# Patient Record
Sex: FEMALE | Race: WHITE | NOT HISPANIC OR LATINO | Employment: FULL TIME | URBAN - METROPOLITAN AREA
[De-identification: names, ages, dates, MRNs, and addresses within clinical notes are randomized per-mention and may not be internally consistent; named-entity substitution may affect disease eponyms.]

---

## 2023-03-09 LAB
CHLAMYDIA TRACH., AMPLIFIED: NEGATIVE
N. GONORRHEA, AMPLIFIED: NEGATIVE
TRICHOMONAS VAGINALIS: NEGATIVE

## 2023-06-29 ENCOUNTER — HOSPITAL ENCOUNTER (OUTPATIENT)
Dept: DATA CONVERSION | Facility: HOSPITAL | Age: 33
End: 2023-06-29
Attending: INTERNAL MEDICINE | Admitting: INTERNAL MEDICINE
Payer: COMMERCIAL

## 2023-06-29 DIAGNOSIS — K59.09 OTHER CONSTIPATION: ICD-10-CM

## 2023-06-29 DIAGNOSIS — K59.00 CONSTIPATION, UNSPECIFIED: ICD-10-CM

## 2023-06-29 DIAGNOSIS — K64.0 FIRST DEGREE HEMORRHOIDS: ICD-10-CM

## 2023-06-29 DIAGNOSIS — Z12.11 ENCOUNTER FOR SCREENING FOR MALIGNANT NEOPLASM OF COLON: ICD-10-CM

## 2023-06-29 DIAGNOSIS — K29.50 UNSPECIFIED CHRONIC GASTRITIS WITHOUT BLEEDING: ICD-10-CM

## 2023-06-29 DIAGNOSIS — R10.13 EPIGASTRIC PAIN: ICD-10-CM

## 2023-06-29 DIAGNOSIS — K31.89 OTHER DISEASES OF STOMACH AND DUODENUM: ICD-10-CM

## 2023-06-29 DIAGNOSIS — K22.89 OTHER SPECIFIED DISEASE OF ESOPHAGUS: ICD-10-CM

## 2023-06-29 DIAGNOSIS — R13.10 DYSPHAGIA, UNSPECIFIED: ICD-10-CM

## 2023-06-29 DIAGNOSIS — Z88.2 ALLERGY STATUS TO SULFONAMIDES: ICD-10-CM

## 2023-07-07 LAB
COMPLETE PATHOLOGY REPORT: NORMAL
CONVERTED CLINICAL DIAGNOSIS-HISTORY: NORMAL
CONVERTED FINAL DIAGNOSIS: NORMAL
CONVERTED FINAL REPORT PDF LINK TO COPY AND PASTE: NORMAL
CONVERTED GROSS DESCRIPTION: NORMAL

## 2023-11-16 ENCOUNTER — OFFICE VISIT (OUTPATIENT)
Dept: SURGERY | Facility: CLINIC | Age: 33
End: 2023-11-16
Payer: COMMERCIAL

## 2023-11-16 VITALS
TEMPERATURE: 98.1 F | DIASTOLIC BLOOD PRESSURE: 76 MMHG | BODY MASS INDEX: 29.71 KG/M2 | HEIGHT: 64 IN | SYSTOLIC BLOOD PRESSURE: 114 MMHG | HEART RATE: 74 BPM | WEIGHT: 174 LBS

## 2023-11-16 DIAGNOSIS — K59.09 CHRONIC CONSTIPATION: ICD-10-CM

## 2023-11-16 DIAGNOSIS — K62.89 ANAL PAIN: Primary | ICD-10-CM

## 2023-11-16 PROCEDURE — 99213 OFFICE O/P EST LOW 20 MIN: CPT | Performed by: NURSE PRACTITIONER

## 2023-11-16 RX ORDER — DEXTROAMPHETAMINE SACCHARATE, AMPHETAMINE ASPARTATE MONOHYDRATE, DEXTROAMPHETAMINE SULFATE AND AMPHETAMINE SULFATE 5; 5; 5; 5 MG/1; MG/1; MG/1; MG/1
20 CAPSULE, EXTENDED RELEASE ORAL
COMMUNITY
Start: 2022-10-16

## 2023-11-16 RX ORDER — SERTRALINE HYDROCHLORIDE 200 MG/1
CAPSULE ORAL
COMMUNITY

## 2023-11-16 ASSESSMENT — ENCOUNTER SYMPTOMS: RECTAL PAIN: 1

## 2023-11-16 NOTE — PROGRESS NOTES
Subjective   Patient ID: Flora Burroughs is a 32 y.o. female who presents today with anal pain.    HPI  She has hemorrhoidal issues and will have anal pain almost daily.  When she had her son 6 years ago, that is when it anal pain started.  The hemorrhoids will prolapse out at times and then go back in.  She will have rectal bleeding as well. She will have a little leakage of stool and mucus a few times ever.  She will have a soft-hard BM every 3-4 days.  She is not taking any fiber supplements or laxatives.  She will sit long on the toilet to have a BM.    Colonosocpy/EGD this summer and it was negative.        Review of Systems   Gastrointestinal:  Positive for rectal pain.   All other systems reviewed and are negative.      Objective   Physical Exam  Constitutional:       Appearance: Normal appearance.   HENT:      Head: Normocephalic.   Cardiovascular:      Rate and Rhythm: Normal rate and regular rhythm.   Pulmonary:      Effort: Pulmonary effort is normal.      Breath sounds: Normal breath sounds.   Abdominal:      General: Abdomen is flat. Bowel sounds are normal.      Palpations: Abdomen is soft.   Genitourinary:     Comments: She has a very small anal skin tag in the anterior midline with no irritation.  On MATTHEW, she had a lot of discomfort in all quadrants.  Did not do an anoscopy d/t the pain.  Musculoskeletal:         General: Normal range of motion.      Cervical back: Normal range of motion and neck supple.   Skin:     General: Skin is warm and dry.   Neurological:      General: No focal deficit present.      Mental Status: She is alert and oriented to person, place, and time.   Psychiatric:         Mood and Affect: Mood normal.         Behavior: Behavior normal.         Assessment/Plan   Flora has an anterior midline anal skin tag and anal pain on MATTHEW.  She will start using Hydrocortisone suppositories BID for 2 weeks.  She will start taking Miralax along with the Metamucil daily.  She will continue to  increase her fiber and water intake.  We talked about rubber band ligation in the near future if not better.  She will call with any issues and will follow up in 4 weeks.

## 2024-02-06 NOTE — H&P (VIEW-ONLY)
HISTORY OF PRESENTING ILLNESS: Flora Burroughs is a 33yoF with an anal skin tag.  She was referred by India Pichardo CNP to discuss surgical removal.  Patient has been seen in the office in the past for episodes of hemorrhoid flares associated with pain and bleeding.  During her last visit attempts were made at banding however due to the degree of discomfort this was aborted.  Instead of performing banding it was recommended that she initiate bowel regimen that included MiraLAX along with daily Metamucil.  She has admitted that this has helped however she does have occasional episodes of discomfort perianally.  On further discussion she admits that she is not as consistent with her regimen and in fact when she does take this she feels some improvement in her overall condition.  She states that she does have perianal skin tags that causes some symptoms of pain and bleeding occasionally.    Normal colonoscopy in 2023 for history of constipation.      Past Medical History:   Diagnosis Date    Abnormal cytological findings in specimens from other organs, systems and tissues     LSIL (low grade squamous intraepithelial lesion) on Pap smear    Anxiety disorder, unspecified     Anxiety and depression    Personal history of other diseases of the digestive system     History of gastroesophageal reflux (GERD)    Personal history of other diseases of the respiratory system     History of asthma    Personal history of other diseases of the respiratory system     History of bronchitis    Personal history of other diseases of urinary system     History of kidney disease    Personal history of other endocrine, nutritional and metabolic disease     History of high cholesterol    Personal history of other specified conditions     History of heartburn         Past Surgical History:   Procedure Laterality Date    COLONOSCOPY      OTHER SURGICAL HISTORY  02/02/2021    No history of surgery         Social History     Tobacco Use     Smoking status: Never   Substance Use Topics    Alcohol use: Never         No family history on file.        Current Outpatient Medications:     amphetamine-dextroamphetamine XR (Adderall XR) 20 mg 24 hr capsule, Take 1 capsule (20 mg) by mouth., Disp: , Rfl:     sertraline 200 mg capsule, Take by mouth., Disp: , Rfl:        Allergies   Allergen Reactions    Sulfa (Sulfonamide Antibiotics) Rash         REVIEW OF SYSTEMS:  Review of Systems   Constitutional: Negative.    Respiratory: Negative.     Cardiovascular: Negative.    Gastrointestinal:  Positive for anal bleeding and constipation.   Genitourinary: Negative.    Skin: Negative.    Neurological: Negative.    Psychiatric/Behavioral: Negative.         Labs:       Imaging:  No results found.     Physical Exam:  Physical Exam  Constitutional:       Appearance: Normal appearance.   HENT:      Head: Normocephalic and atraumatic.   Eyes:      Extraocular Movements: Extraocular movements intact.   Cardiovascular:      Rate and Rhythm: Normal rate.   Pulmonary:      Effort: Pulmonary effort is normal.   Genitourinary:     Comments: deffered  Musculoskeletal:      Cervical back: Normal range of motion.   Neurological:      General: No focal deficit present.      Mental Status: She is alert.   Psychiatric:         Mood and Affect: Mood normal.       ASSESSMENT AND PLAN: I had a long discussion with the patient regarding risks and benefits of hemorrhoid/skin tag surgery.  Patient desires to have her skin tag at least removed.  She is aware of risks including not limited to severe perianal infection incontinence bleeding etc.  We discussed the occasional need for concurrent hemorrhoidectomy depending on the anatomy of the skin tag.  Because of this possibility she is also aware of the severe discomfort and alteration to clear life in the immediate postop period with regards to missing work etc.  The patient would like to proceed with surgical intervention.    Brandon FULLER  MD Tessie Ramos, RN   2/6/2024

## 2024-02-13 ENCOUNTER — OFFICE VISIT (OUTPATIENT)
Dept: SURGERY | Facility: CLINIC | Age: 34
End: 2024-02-13
Payer: COMMERCIAL

## 2024-02-13 ENCOUNTER — PREP FOR PROCEDURE (OUTPATIENT)
Dept: SURGERY | Facility: CLINIC | Age: 34
End: 2024-02-13

## 2024-02-13 VITALS
SYSTOLIC BLOOD PRESSURE: 117 MMHG | TEMPERATURE: 98.3 F | HEIGHT: 64 IN | DIASTOLIC BLOOD PRESSURE: 89 MMHG | HEART RATE: 73 BPM | BODY MASS INDEX: 30.56 KG/M2 | WEIGHT: 179 LBS

## 2024-02-13 DIAGNOSIS — K62.89 ANAL PAIN: Primary | ICD-10-CM

## 2024-02-13 DIAGNOSIS — K64.4 ANAL SKIN TAG: Primary | ICD-10-CM

## 2024-02-13 PROCEDURE — 99214 OFFICE O/P EST MOD 30 MIN: CPT | Performed by: COLON & RECTAL SURGERY

## 2024-02-13 PROCEDURE — 1036F TOBACCO NON-USER: CPT | Performed by: COLON & RECTAL SURGERY

## 2024-02-13 RX ORDER — SODIUM CHLORIDE, SODIUM LACTATE, POTASSIUM CHLORIDE, CALCIUM CHLORIDE 600; 310; 30; 20 MG/100ML; MG/100ML; MG/100ML; MG/100ML
10 INJECTION, SOLUTION INTRAVENOUS CONTINUOUS
Status: CANCELLED | OUTPATIENT
Start: 2024-02-13

## 2024-02-13 ASSESSMENT — ENCOUNTER SYMPTOMS
PSYCHIATRIC NEGATIVE: 1
NEUROLOGICAL NEGATIVE: 1
CONSTIPATION: 1
ANAL BLEEDING: 1
CONSTITUTIONAL NEGATIVE: 1
RESPIRATORY NEGATIVE: 1
CARDIOVASCULAR NEGATIVE: 1

## 2024-02-13 ASSESSMENT — PAIN SCALES - GENERAL: PAINLEVEL: 3

## 2024-02-21 RX ORDER — VALACYCLOVIR HYDROCHLORIDE 500 MG/1
500 TABLET, FILM COATED ORAL DAILY
COMMUNITY

## 2024-03-06 ENCOUNTER — ANESTHESIA EVENT (OUTPATIENT)
Dept: OPERATING ROOM | Facility: CLINIC | Age: 34
End: 2024-03-06
Payer: COMMERCIAL

## 2024-03-06 ENCOUNTER — ANESTHESIA (OUTPATIENT)
Dept: OPERATING ROOM | Facility: CLINIC | Age: 34
End: 2024-03-06
Payer: COMMERCIAL

## 2024-03-06 ENCOUNTER — HOSPITAL ENCOUNTER (OUTPATIENT)
Facility: CLINIC | Age: 34
Setting detail: OUTPATIENT SURGERY
Discharge: HOME | End: 2024-03-06
Attending: COLON & RECTAL SURGERY | Admitting: COLON & RECTAL SURGERY
Payer: COMMERCIAL

## 2024-03-06 VITALS
TEMPERATURE: 98.1 F | RESPIRATION RATE: 16 BRPM | HEIGHT: 65 IN | OXYGEN SATURATION: 99 % | BODY MASS INDEX: 29.9 KG/M2 | SYSTOLIC BLOOD PRESSURE: 110 MMHG | WEIGHT: 179.45 LBS | HEART RATE: 69 BPM | DIASTOLIC BLOOD PRESSURE: 66 MMHG

## 2024-03-06 DIAGNOSIS — K64.4 ANAL SKIN TAG: Primary | ICD-10-CM

## 2024-03-06 LAB — PREGNANCY TEST URINE, POC: NEGATIVE

## 2024-03-06 PROCEDURE — 2500000004 HC RX 250 GENERAL PHARMACY W/ HCPCS (ALT 636 FOR OP/ED): Mod: SE

## 2024-03-06 PROCEDURE — 3700000002 HC GENERAL ANESTHESIA TIME - EACH INCREMENTAL 1 MINUTE: Performed by: COLON & RECTAL SURGERY

## 2024-03-06 PROCEDURE — 7100000009 HC PHASE TWO TIME - INITIAL BASE CHARGE: Performed by: COLON & RECTAL SURGERY

## 2024-03-06 PROCEDURE — 0752T DGTZ GLS MCRSCP SLD LVL III: CPT | Mod: TC,ELYLAB | Performed by: COLON & RECTAL SURGERY

## 2024-03-06 PROCEDURE — A46220 PR EXCISION SINGLE EXTERNAL PAPILLA OR TAG ANUS

## 2024-03-06 PROCEDURE — 3700000001 HC GENERAL ANESTHESIA TIME - INITIAL BASE CHARGE: Performed by: COLON & RECTAL SURGERY

## 2024-03-06 PROCEDURE — 3600000005 HC OR TIME - INITIAL BASE CHARGE - PROCEDURE LEVEL FIVE: Performed by: COLON & RECTAL SURGERY

## 2024-03-06 PROCEDURE — 2500000004 HC RX 250 GENERAL PHARMACY W/ HCPCS (ALT 636 FOR OP/ED): Mod: SE | Performed by: COLON & RECTAL SURGERY

## 2024-03-06 PROCEDURE — 7100000010 HC PHASE TWO TIME - EACH INCREMENTAL 1 MINUTE: Performed by: COLON & RECTAL SURGERY

## 2024-03-06 PROCEDURE — 88304 TISSUE EXAM BY PATHOLOGIST: CPT | Performed by: PATHOLOGY

## 2024-03-06 PROCEDURE — A46220 PR EXCISION SINGLE EXTERNAL PAPILLA OR TAG ANUS: Performed by: ANESTHESIOLOGY

## 2024-03-06 PROCEDURE — 46922 EXCISION OF ANAL LESION(S): CPT | Performed by: COLON & RECTAL SURGERY

## 2024-03-06 PROCEDURE — 2500000005 HC RX 250 GENERAL PHARMACY W/O HCPCS: Mod: SE | Performed by: COLON & RECTAL SURGERY

## 2024-03-06 PROCEDURE — 3600000010 HC OR TIME - EACH INCREMENTAL 1 MINUTE - PROCEDURE LEVEL FIVE: Performed by: COLON & RECTAL SURGERY

## 2024-03-06 RX ORDER — ONDANSETRON HYDROCHLORIDE 2 MG/ML
4 INJECTION, SOLUTION INTRAVENOUS ONCE AS NEEDED
Status: DISCONTINUED | OUTPATIENT
Start: 2024-03-06 | End: 2024-03-06 | Stop reason: HOSPADM

## 2024-03-06 RX ORDER — OXYCODONE HYDROCHLORIDE 5 MG/1
5 TABLET ORAL EVERY 4 HOURS PRN
Status: DISCONTINUED | OUTPATIENT
Start: 2024-03-06 | End: 2024-03-06 | Stop reason: HOSPADM

## 2024-03-06 RX ORDER — SODIUM CHLORIDE, SODIUM LACTATE, POTASSIUM CHLORIDE, CALCIUM CHLORIDE 600; 310; 30; 20 MG/100ML; MG/100ML; MG/100ML; MG/100ML
10 INJECTION, SOLUTION INTRAVENOUS CONTINUOUS
Status: DISCONTINUED | OUTPATIENT
Start: 2024-03-06 | End: 2024-03-06 | Stop reason: HOSPADM

## 2024-03-06 RX ORDER — MIDAZOLAM HYDROCHLORIDE 1 MG/ML
INJECTION, SOLUTION INTRAMUSCULAR; INTRAVENOUS AS NEEDED
Status: DISCONTINUED | OUTPATIENT
Start: 2024-03-06 | End: 2024-03-06

## 2024-03-06 RX ORDER — PROPOFOL 10 MG/ML
INJECTION, EMULSION INTRAVENOUS CONTINUOUS PRN
Status: DISCONTINUED | OUTPATIENT
Start: 2024-03-06 | End: 2024-03-06

## 2024-03-06 RX ORDER — PROPOFOL 10 MG/ML
INJECTION, EMULSION INTRAVENOUS AS NEEDED
Status: DISCONTINUED | OUTPATIENT
Start: 2024-03-06 | End: 2024-03-06

## 2024-03-06 RX ORDER — BUPIVACAINE HYDROCHLORIDE 5 MG/ML
INJECTION, SOLUTION PERINEURAL AS NEEDED
Status: DISCONTINUED | OUTPATIENT
Start: 2024-03-06 | End: 2024-03-06 | Stop reason: HOSPADM

## 2024-03-06 RX ORDER — FENTANYL CITRATE 50 UG/ML
INJECTION, SOLUTION INTRAMUSCULAR; INTRAVENOUS AS NEEDED
Status: DISCONTINUED | OUTPATIENT
Start: 2024-03-06 | End: 2024-03-06

## 2024-03-06 RX ORDER — SODIUM CHLORIDE, SODIUM LACTATE, POTASSIUM CHLORIDE, CALCIUM CHLORIDE 600; 310; 30; 20 MG/100ML; MG/100ML; MG/100ML; MG/100ML
100 INJECTION, SOLUTION INTRAVENOUS CONTINUOUS
Status: DISCONTINUED | OUTPATIENT
Start: 2024-03-06 | End: 2024-03-06 | Stop reason: HOSPADM

## 2024-03-06 RX ORDER — ALBUTEROL SULFATE 0.83 MG/ML
2.5 SOLUTION RESPIRATORY (INHALATION) ONCE AS NEEDED
Status: DISCONTINUED | OUTPATIENT
Start: 2024-03-06 | End: 2024-03-06 | Stop reason: HOSPADM

## 2024-03-06 RX ORDER — LIDOCAINE IN NACL,ISO-OSMOT/PF 30 MG/3 ML
0.1 SYRINGE (ML) INJECTION ONCE
Status: DISCONTINUED | OUTPATIENT
Start: 2024-03-06 | End: 2024-03-06 | Stop reason: HOSPADM

## 2024-03-06 RX ADMIN — MIDAZOLAM 2 MG: 1 INJECTION INTRAMUSCULAR; INTRAVENOUS at 08:42

## 2024-03-06 RX ADMIN — PROPOFOL 400 MCG/KG/MIN: 10 INJECTION, EMULSION INTRAVENOUS at 08:49

## 2024-03-06 RX ADMIN — SODIUM CHLORIDE, POTASSIUM CHLORIDE, SODIUM LACTATE AND CALCIUM CHLORIDE: 600; 310; 30; 20 INJECTION, SOLUTION INTRAVENOUS at 08:42

## 2024-03-06 RX ADMIN — FENTANYL CITRATE 50 MCG: 50 INJECTION, SOLUTION INTRAMUSCULAR; INTRAVENOUS at 08:48

## 2024-03-06 RX ADMIN — FENTANYL CITRATE 50 MCG: 50 INJECTION, SOLUTION INTRAMUSCULAR; INTRAVENOUS at 08:56

## 2024-03-06 SDOH — HEALTH STABILITY: MENTAL HEALTH: CURRENT SMOKER: 0

## 2024-03-06 ASSESSMENT — PAIN - FUNCTIONAL ASSESSMENT
PAIN_FUNCTIONAL_ASSESSMENT: 0-10

## 2024-03-06 ASSESSMENT — PAIN SCALES - GENERAL
PAINLEVEL_OUTOF10: 0 - NO PAIN
PAINLEVEL_OUTOF10: 0 - NO PAIN
PAIN_LEVEL: 0
PAINLEVEL_OUTOF10: 0 - NO PAIN
PAINLEVEL_OUTOF10: 0 - NO PAIN

## 2024-03-06 ASSESSMENT — COLUMBIA-SUICIDE SEVERITY RATING SCALE - C-SSRS
2. HAVE YOU ACTUALLY HAD ANY THOUGHTS OF KILLING YOURSELF?: NO
6. HAVE YOU EVER DONE ANYTHING, STARTED TO DO ANYTHING, OR PREPARED TO DO ANYTHING TO END YOUR LIFE?: NO
1. IN THE PAST MONTH, HAVE YOU WISHED YOU WERE DEAD OR WISHED YOU COULD GO TO SLEEP AND NOT WAKE UP?: NO

## 2024-03-06 NOTE — OP NOTE
Excision Lesion Anus Operative Note     Date: 3/6/2024  OR Location: [facility] OR    Name: Flora Burroughs, : 1990, Age: 33 y.o., MRN: [id number], Sex: female    Diagnosis  Pre-op Diagnosis     * Anal skin tag [K64.4] Post-op Diagnosis     * Anal skin tag [K64.4]     Procedures  Excision Lesion Anus  49998 - AL DSTRJ LESION ANUS SIMPLE SURG EXCISION      Surgeons      * Brandon Ramos - Primary    Resident/Fellow/Other Assistant:  Surgeon(s) and Role:    Procedure Summary  Anesthesia: Monitor Anesthesia Care  ASA: ASA status not filed in the log.  Anesthesia Staff: Anesthesiologist: MD ZENA Woodson-AA: LYNN Gunter  Estimated Blood Loss: 10mL  Intra-op Medications:   Administrations occurring from 0815 to 0900 on 24:   Medication Name Total Dose   lactated Ringer's infusion Cannot be calculated              Anesthesia Record               Intraprocedure I/O Totals          Intake    Propofol Drip 0.00 mL    The total shown is the total volume documented since Anesthesia Start was filed.    Total Intake 0 mL          Specimen:   ID Type Source Tests Collected by Time   1 : Anal skin tag Tissue ANUS TAG SURGICAL PATHOLOGY EXAM Brandon Ramos MD 3/6/2024 0903        Staff:   Circulator: Rosalinda Kahn RN      Findings: 0.5 cm anterior midline skin tag    Indications: Flora Burroughs is an 33 y.o. female who is having surgery for Anal skin tag [K64.4].     The patient was seen in the preoperative area. The risks, benefits, complications, treatment options, non-operative alternatives, expected recovery and outcomes were discussed with the patient. The possibilities of reaction to medication, pulmonary aspiration, injury to surrounding structures, bleeding, recurrent infection, the need for additional procedures, failure to diagnose a condition, and creating a complication requiring transfusion or operation were discussed with the patient. The patient concurred with the proposed plan,  giving informed consent.  The site of surgery was properly noted/marked if necessary per policy. The patient has been actively warmed in preoperative area. Preoperative antibiotics are not indicated. Venous thrombosis prophylaxis are not indicated.    Procedure Details: Patient was placed supine on the operating table.  Anesthesia was induced.  Her legs were placed in candycane stirrups.  Examination of the perianal area revealed a 0.5 cm anterior midline anal skin tag.  No other abnormalities were noted.  A digital exam was performed after the patient patient was prepped and draped in a sterile fashion.  Exam revealed a normal anal canal.  Hill-Rivera retractor was then placed transanally and all 4 quadrants were examined.  Grade 1 hemorrhoids were noted on exam no evidence of active bleeding.  3 cc of quarter percent Marcaine was then injected into the base of the skin tag and using Bovie electrocautery the skin tag was excised in its entirety taking care not to injure the underlying sphincter mechanism.  Using 0 Vicryl stitches in a vertical mattress fashion the wound was closed.  Patient tolerated procedure well without evidence any complication she was awake and transferred to the PACU for postop care.  Complications:  None; patient tolerated the procedure well.    Disposition: PACU - hemodynamically stable.  Condition: stable       Attending Attestation: I was present and scrubbed for the entire procedure.    Brandon Ramos  Phone Number: [phone]

## 2024-03-06 NOTE — DISCHARGE INSTRUCTIONS
"Brandon Ramos MD  [facility]  Office Phone: [phone]  After Hours: [phone]     POST-OPERATIVE INSTRUCTIONS FOR  AMBULATORY ANORECTAL SURGERY        Wound Care  -Take all the bandages off in the late afternoon or evening and take the first hot bath  -The bath water should be as warm as tolerable, without causing burns  -It is NOT necessary to add anything to the water (such as Epsom salt)  -Hot baths should be used at least 3-4 times each day AND especially after each bowel movement  -The \"packing\" (if present) should be removed from the anus during the first bath  -Expect some oozing or slight bleeding  -No bandages are necessary, but may be used as desired to absorb any drainage: plain gauze or maxi pads    Pain  -The anesthetic that was injected at the time of surgery should last 3-6 hours  -It is normal for the anal area to be very painful for several days, and especially after the initial bowel movements  -HOT BATHS PROVIDE THE BEST PAIN RELIEF, and should be used liberally  -The prescription you have been given is for a strong narcotic pain medication. Take 1-2 tabs every 4-6 hours as needed for pain  -Use the pain medication as necessary, but be aware that it will cause some degree of constipation  -If the pain is less severe, Advil (up to 600 mg every 4 hours) or Tylenol (up to 1000 mg every 4 hours) should be substituted. Do not take Percocet AND Tylenol together at the same time. You may take Percocet and Advil.     Skin Irritation:  -If you have increased drainage after your procedure, this can cause skin irritation which can be uncomfortable.   -To minimize skin irritation keep skin clean and dry by patting area dry or use a hair dryer on cool to eliminate moisture. Use a skin barrier cream around anus such as Aquaphor.   -For excess moisture, place a cotton ball or 4x4 gauze pad on the outside of anus and change as needed. This will wick away moisture from " skin.    Diet  -Eat as much fiber as possible: fruits, vegetables, salads, whole grain breads, bran cereals, bran muffins, prunes and prune juice  -Drink lots of fluids: water and juices  -Avoid spicy foods until the wounds are healed          Bowel Movements  -It is common not to move your bowels for 2 or 3 days after surgery  -It is important to try to avoid becoming constipated  -A high fiber diet is essential  -Take fiber powder such as Metamucil once or twice a day, and/or Colace three times a day  -Excessive pressure in the rectum (or even pain) may indicate the need to have a bowel movement  -If a laxative is required usually try two tablespoons of Milk of Magnesia; if this is not effective, take Miralax  -THE FIRST BOWEL MOVEMENT HURTS!!!    Activity  -You may resume normal activities, including exercising, as soon as you feel up to it  -You can return to work whenever you feel able  -There is no way you can do yourself any harm or affect the success of the surgery by excessive activity    Things to Watch For  -A small amount of bleeding or oozing is normal, especially with bowel movements; if bleeding is heavy, or does not stop after bowel movements, call me immediately.  -It is not unusual to have difficulty urinating after surgery; often it is necessary it urinate while sitting in the hot bath; frequent urination of very small volumes is abnormal and requires that you call me. If you have not urinated at all by the first evening, you must call.    Medications:  Resume your medication(s) as prescribed, unless otherwise directed.      Follow-up Information  -If you do not already have an appointment, call my office within a few days, and make an appointment for a check-up about 6 weeks after your surgery, call: [phone] Option 1.   -If there are questions or problems either I or one of my partners can be reached 24 hours a day, seven days a week. Call the office [phone] Option 2 and the service  will take a message and arrange a call back.    Tylenol given at 8:00 am Next tylenol at 2pm    Scopalamine patch may stay on for 72 hrs.  Remove patch, discard away form pets, children.  Do not touch eyes!  Wash hands thoroughly

## 2024-03-06 NOTE — ANESTHESIA POSTPROCEDURE EVALUATION
Patient: Flora Burroughs    Procedure Summary       Date: 03/06/24 Room / Location: [facility] OR 03 / [facility] [place] [facility]    Anesthesia Start: 0842 Anesthesia Stop: 0912    Procedure: Excision Lesion Anus Diagnosis:       Anal skin tag      (Anal skin tag [K64.4])    Surgeons: Brandon Ramos MD Responsible Provider: Oz Don MD    Anesthesia Type: MAC ASA Status: 2            Anesthesia Type: MAC    Vitals Value Taken Time   /45 03/06/24 0913   Temp 36.5 03/06/24 0913   Pulse 63 03/06/24 0913   Resp 16 03/06/24 0913   SpO2 100 03/06/24 0913       Anesthesia Post Evaluation    Patient location during evaluation: bedside  Patient participation: waiting for patient participation  Level of consciousness: awake  Pain score: 0  Pain management: adequate  Airway patency: patent  Cardiovascular status: acceptable  Respiratory status: acceptable  Hydration status: acceptable  Postoperative Nausea and Vomiting: none      There were no known notable events for this encounter.

## 2024-03-06 NOTE — ANESTHESIA PREPROCEDURE EVALUATION
Patient: Flora Burroughs    Procedure Information       Anesthesia Start Date/Time: 03/06/24 0842    Procedure: Excision Lesion Anus    Location: [facility] OR 03 / [facility]    Surgeons: Brandon Ramos MD            Relevant Problems   No relevant active problems       Clinical information reviewed:   Tobacco  Allergies  Meds   Med Hx  Surg Hx  OB Status  Fam Hx  Soc   Hx        NPO Detail:  NPO/Void Status  Date of Last Liquid: 03/05/24  Time of Last Liquid: 2000  Date of Last Solid: 03/05/24  Time of Last Solid: 2000         PHYSICAL EXAM    Anesthesia Plan    History of general anesthesia?: yes  History of complications of general anesthesia?: no    ASA 2     general     The patient is not a current smoker.  Patient was previously instructed to abstain from smoking on day of procedure.  Patient did not smoke on day of procedure.    intravenous induction   Anesthetic plan and risks discussed with patient.  Use of blood products discussed with patient who.    Plan discussed with CAA and attending.

## 2024-03-07 ASSESSMENT — PAIN SCALES - GENERAL: PAINLEVEL_OUTOF10: 4

## 2024-03-13 LAB
LABORATORY COMMENT REPORT: NORMAL
PATH REPORT.FINAL DX SPEC: NORMAL
PATH REPORT.GROSS SPEC: NORMAL
PATH REPORT.RELEVANT HX SPEC: NORMAL
PATH REPORT.TOTAL CANCER: NORMAL

## 2024-04-18 ENCOUNTER — APPOINTMENT (OUTPATIENT)
Dept: SURGERY | Facility: CLINIC | Age: 34
End: 2024-04-18
Payer: COMMERCIAL

## 2024-04-25 ENCOUNTER — APPOINTMENT (OUTPATIENT)
Dept: SURGERY | Facility: CLINIC | Age: 34
End: 2024-04-25
Payer: COMMERCIAL

## 2024-07-08 ENCOUNTER — APPOINTMENT (OUTPATIENT)
Dept: PRIMARY CARE | Facility: CLINIC | Age: 34
End: 2024-07-08
Payer: COMMERCIAL

## 2024-07-10 ENCOUNTER — OFFICE VISIT (OUTPATIENT)
Dept: PRIMARY CARE | Facility: CLINIC | Age: 34
End: 2024-07-10
Payer: COMMERCIAL

## 2024-07-10 VITALS
HEART RATE: 79 BPM | HEIGHT: 64 IN | BODY MASS INDEX: 28.89 KG/M2 | WEIGHT: 169.2 LBS | SYSTOLIC BLOOD PRESSURE: 103 MMHG | OXYGEN SATURATION: 97 % | DIASTOLIC BLOOD PRESSURE: 67 MMHG | TEMPERATURE: 97.6 F | RESPIRATION RATE: 16 BRPM

## 2024-07-10 DIAGNOSIS — Z86.19 HX OF COLD SORES: ICD-10-CM

## 2024-07-10 DIAGNOSIS — F90.9 ATTENTION DEFICIT HYPERACTIVITY DISORDER (ADHD), UNSPECIFIED ADHD TYPE: ICD-10-CM

## 2024-07-10 DIAGNOSIS — F41.9 ANXIETY: ICD-10-CM

## 2024-07-10 DIAGNOSIS — F32.A DEPRESSION, UNSPECIFIED DEPRESSION TYPE: Primary | ICD-10-CM

## 2024-07-10 PROCEDURE — 99213 OFFICE O/P EST LOW 20 MIN: CPT | Performed by: NURSE PRACTITIONER

## 2024-07-10 PROCEDURE — 1036F TOBACCO NON-USER: CPT | Performed by: NURSE PRACTITIONER

## 2024-07-10 RX ORDER — VALACYCLOVIR HYDROCHLORIDE 500 MG/1
500 TABLET, FILM COATED ORAL DAILY
Qty: 30 TABLET | Refills: 5 | Status: SHIPPED | OUTPATIENT
Start: 2024-07-10 | End: 2025-07-10

## 2024-07-10 RX ORDER — SERTRALINE HYDROCHLORIDE 100 MG/1
100 TABLET, FILM COATED ORAL 2 TIMES DAILY
Start: 2024-07-10 | End: 2024-09-08

## 2024-07-10 RX ORDER — DEXTROAMPHETAMINE SACCHARATE, AMPHETAMINE ASPARTATE MONOHYDRATE, DEXTROAMPHETAMINE SULFATE AND AMPHETAMINE SULFATE 7.5; 7.5; 7.5; 7.5 MG/1; MG/1; MG/1; MG/1
30 CAPSULE, EXTENDED RELEASE ORAL
COMMUNITY
Start: 2024-02-19

## 2024-07-10 ASSESSMENT — ENCOUNTER SYMPTOMS
SLEEP DISTURBANCE: 0
FEVER: 0
HYPERACTIVE: 0
DIARRHEA: 0
ACTIVITY CHANGE: 0
APNEA: 0
DIAPHORESIS: 0
APPETITE CHANGE: 0
CHILLS: 0
CHEST TIGHTNESS: 0
PALPITATIONS: 0
CHOKING: 0
COUGH: 0
ABDOMINAL PAIN: 0
VOMITING: 0
HALLUCINATIONS: 0
SHORTNESS OF BREATH: 0
UNEXPECTED WEIGHT CHANGE: 0
STRIDOR: 0
AGITATION: 0
FATIGUE: 0
DECREASED CONCENTRATION: 0
CONFUSION: 0
WHEEZING: 0
NAUSEA: 0

## 2024-07-10 NOTE — PROGRESS NOTES
"Subjective   Patient ID: Flora Burroughs is a 33 y.o. female who presents for ADHD, Depression, and Anxiety.     HPI  Patient in office for a follow-up on anxiety, depression (both well-controlled on Zoloft), and ADHD (takes Adderall as needed; ran out weeks ago). The patient last saw her psychiatrist 5 months ago. The patient has new health insurance and agrees to a referral to a new psychiatrist, to be reevaluated for ADHD treatment. The patient has supplies of Zoloft. She also needs a refill on Valacyclovir, which she takes daily for cold sore suppression therapy (last episode 1 year ago). She has no other concerns today.    JESSI-7: 3/21; PHQ-9: 8/27    Review of Systems   Constitutional:  Negative for activity change, appetite change, chills, diaphoresis, fatigue, fever and unexpected weight change.   Respiratory:  Negative for apnea, cough, choking, chest tightness, shortness of breath, wheezing and stridor.    Cardiovascular:  Negative for chest pain, palpitations and leg swelling.   Gastrointestinal:  Negative for abdominal pain, diarrhea, nausea and vomiting.   Skin:         Hx of cold sores   Psychiatric/Behavioral:  Negative for agitation, behavioral problems, confusion, decreased concentration, hallucinations, self-injury, sleep disturbance and suicidal ideas. The patient is not hyperactive.         Hx of anxiety, depression, and ADHD       Objective   /67   Pulse 79   Temp 36.4 °C (97.6 °F) (Temporal)   Resp 16   Ht 1.626 m (5' 4\")   Wt 76.7 kg (169 lb 3.2 oz)   SpO2 97%   BMI 29.04 kg/m²     Physical Exam  Constitutional:       Appearance: Normal appearance.   Eyes:      Conjunctiva/sclera: Conjunctivae normal.   Cardiovascular:      Rate and Rhythm: Normal rate.   Pulmonary:      Effort: Pulmonary effort is normal.   Skin:     Findings: No lesion or rash.   Neurological:      General: No focal deficit present.      Mental Status: She is alert.      Gait: Gait normal.   Psychiatric:         " Mood and Affect: Mood normal.       Assessment/Plan     Referral, medication and plan of care reviewed with patient. Follow up in 3 months for a physical or earlier as needed.

## 2024-12-10 ENCOUNTER — LAB (OUTPATIENT)
Dept: LAB | Facility: LAB | Age: 34
End: 2024-12-10
Payer: COMMERCIAL

## 2024-12-10 DIAGNOSIS — E03.9 HYPOTHYROIDISM, UNSPECIFIED: ICD-10-CM

## 2024-12-10 DIAGNOSIS — E55.9 VITAMIN D DEFICIENCY, UNSPECIFIED: Primary | ICD-10-CM

## 2024-12-10 DIAGNOSIS — E11.9 TYPE 2 DIABETES MELLITUS WITHOUT COMPLICATIONS (MULTI): ICD-10-CM

## 2024-12-10 LAB
25(OH)D3 SERPL-MCNC: 24 NG/ML (ref 30–100)
ALBUMIN SERPL BCP-MCNC: 4.4 G/DL (ref 3.4–5)
ALP SERPL-CCNC: 58 U/L (ref 33–110)
ALT SERPL W P-5'-P-CCNC: 11 U/L (ref 7–45)
ANION GAP SERPL CALC-SCNC: 13 MMOL/L (ref 10–20)
AST SERPL W P-5'-P-CCNC: 16 U/L (ref 9–39)
BILIRUB SERPL-MCNC: 0.4 MG/DL (ref 0–1.2)
BUN SERPL-MCNC: 14 MG/DL (ref 6–23)
CALCIUM SERPL-MCNC: 9.5 MG/DL (ref 8.6–10.6)
CHLORIDE SERPL-SCNC: 104 MMOL/L (ref 98–107)
CO2 SERPL-SCNC: 26 MMOL/L (ref 21–32)
CREAT SERPL-MCNC: 0.86 MG/DL (ref 0.5–1.05)
EGFRCR SERPLBLD CKD-EPI 2021: >90 ML/MIN/1.73M*2
GLUCOSE SERPL-MCNC: 92 MG/DL (ref 74–99)
POTASSIUM SERPL-SCNC: 4.2 MMOL/L (ref 3.5–5.3)
PROT SERPL-MCNC: 7.4 G/DL (ref 6.4–8.2)
SODIUM SERPL-SCNC: 139 MMOL/L (ref 136–145)
T4 FREE SERPL-MCNC: 1 NG/DL (ref 0.78–1.48)
THYROPEROXIDASE AB SERPL-ACNC: >1000 IU/ML
TSH SERPL-ACNC: 4.42 MIU/L (ref 0.44–3.98)

## 2024-12-10 PROCEDURE — 36415 COLL VENOUS BLD VENIPUNCTURE: CPT

## 2024-12-10 PROCEDURE — 84439 ASSAY OF FREE THYROXINE: CPT

## 2024-12-10 PROCEDURE — 82306 VITAMIN D 25 HYDROXY: CPT

## 2024-12-10 PROCEDURE — 80053 COMPREHEN METABOLIC PANEL: CPT

## 2024-12-10 PROCEDURE — 86376 MICROSOMAL ANTIBODY EACH: CPT

## 2024-12-10 PROCEDURE — 84443 ASSAY THYROID STIM HORMONE: CPT

## 2025-04-15 PROCEDURE — RXMED WILLOW AMBULATORY MEDICATION CHARGE

## 2025-04-17 ENCOUNTER — PHARMACY VISIT (OUTPATIENT)
Dept: PHARMACY | Facility: CLINIC | Age: 35
End: 2025-04-17
Payer: COMMERCIAL

## 2025-05-16 ENCOUNTER — OFFICE VISIT (OUTPATIENT)
Dept: URGENT CARE | Age: 35
End: 2025-05-16
Payer: COMMERCIAL

## 2025-05-16 ENCOUNTER — PHARMACY VISIT (OUTPATIENT)
Dept: PHARMACY | Facility: CLINIC | Age: 35
End: 2025-05-16
Payer: COMMERCIAL

## 2025-05-16 VITALS
TEMPERATURE: 97.6 F | WEIGHT: 175 LBS | HEIGHT: 64 IN | RESPIRATION RATE: 18 BRPM | SYSTOLIC BLOOD PRESSURE: 117 MMHG | HEART RATE: 72 BPM | BODY MASS INDEX: 29.88 KG/M2 | OXYGEN SATURATION: 97 % | DIASTOLIC BLOOD PRESSURE: 83 MMHG

## 2025-05-16 DIAGNOSIS — R35.0 FREQUENCY OF URINATION: ICD-10-CM

## 2025-05-16 DIAGNOSIS — Z20.2 POSSIBLE EXPOSURE TO STI: Primary | ICD-10-CM

## 2025-05-16 LAB
POC APPEARANCE, URINE: CLEAR
POC BILIRUBIN, URINE: NEGATIVE
POC BLOOD, URINE: NEGATIVE
POC COLOR, URINE: YELLOW
POC GLUCOSE, URINE: NEGATIVE MG/DL
POC KETONES, URINE: NEGATIVE MG/DL
POC LEUKOCYTES, URINE: NEGATIVE
POC NITRITE,URINE: NEGATIVE
POC PH, URINE: 6 PH
POC PROTEIN, URINE: NEGATIVE MG/DL
POC SPECIFIC GRAVITY, URINE: >=1.03
POC UROBILINOGEN, URINE: 0.2 EU/DL
PREGNANCY TEST URINE, POC: NEGATIVE

## 2025-05-16 PROCEDURE — RXMED WILLOW AMBULATORY MEDICATION CHARGE

## 2025-05-16 RX ORDER — DEXTROAMPHETAMINE SACCHARATE, AMPHETAMINE ASPARTATE MONOHYDRATE, DEXTROAMPHETAMINE SULFATE AND AMPHETAMINE SULFATE 7.5; 7.5; 7.5; 7.5 MG/1; MG/1; MG/1; MG/1
30 CAPSULE, EXTENDED RELEASE ORAL DAILY
Qty: 30 CAPSULE | Refills: 0 | OUTPATIENT
Start: 2025-05-16

## 2025-05-16 RX ORDER — AZITHROMYCIN 250 MG/1
1000 TABLET, FILM COATED ORAL ONCE
Qty: 4 TABLET | Refills: 0 | Status: SHIPPED | OUTPATIENT
Start: 2025-05-16 | End: 2025-05-17

## 2025-05-16 ASSESSMENT — ENCOUNTER SYMPTOMS
CONSTITUTIONAL NEGATIVE: 1
FREQUENCY: 1
NEUROLOGICAL NEGATIVE: 1
ABDOMINAL PAIN: 0
CARDIOVASCULAR NEGATIVE: 1
RESPIRATORY NEGATIVE: 1
DIARRHEA: 0
SHORTNESS OF BREATH: 0
DYSURIA: 1
VOMITING: 0
NAUSEA: 0
COUGH: 0
GASTROINTESTINAL NEGATIVE: 1
WHEEZING: 0

## 2025-05-16 NOTE — PROGRESS NOTES
"Subjective   Patient ID: Flora Burroughs is a 34 y.o. female. They present today with a chief complaint of Exposure to STD (Possible exposure to gonorrhea, possible UTI ).    History of Present Illness  Subjective  Flora Burroughs is a 34 y.o. female who complains of burning with urination, frequency, suprapubic pressure, and urgency for 2 days. Patient does not have a history of recurrent UTI or pyelonephritis. Pt states she had unprotected sex with a new partner, and was contacted by a former partner that partner has gonorrhea. Pt requesting testing.       Objective  /83   Pulse 72   Temp 36.4 °C (97.6 °F) (Oral)   Resp 18   Ht 1.626 m (5' 4\")   Wt 79.4 kg (175 lb)   LMP 05/05/2025   SpO2 97%   BMI 30.04 kg/m²    General: alert and oriented, in no acute distress  Abdomen: soft, non-tender, without masses or organomegaly  Back: CVA tenderness absent    Laboratory:   Urine dipstick shows negative.    Micro exam: pending.    Assessment/Plan  Diagnoses and associated orders for this visit:    · Possible exposure to STI   cefTRIAXone (Rocephin) 500 mg in lidocaine (Xylocaine) 2 mL injection   azithromycin (Zithromax) 250 mg tablet; Take 4 tablets (1,000 mg) by mouth 1 time for 1 dose. Take 4 tablets once.   Urine Culture   Trichomonas vaginalis, Amplified   C. trachomatis / N. gonorrhoeae, Amplified, Urogenital   POCT pregnancy, urine manually resulted    · Frequency of urination   POCT UA Automated manually resulted            History provided by:  Patient and medical records  Exposure to STD  Associated symptoms: no abdominal pain, no chest pain, no cough, no diarrhea, no nausea, no shortness of breath, no vomiting and no wheezing        Past Medical History  Allergies as of 05/16/2025 - Reviewed 05/16/2025   Allergen Reaction Noted    Sulfa (sulfonamide antibiotics) Rash 11/16/2023       Prescriptions Prior to Admission[1]     Medical History[2]    Surgical History[3]     reports that she has never " "smoked. She has never used smokeless tobacco. She reports that she does not currently use alcohol. She reports that she does not use drugs.    Review of Systems  Review of Systems   Constitutional: Negative.    HENT: Negative.     Respiratory: Negative.  Negative for cough, shortness of breath and wheezing.    Cardiovascular: Negative.  Negative for chest pain.   Gastrointestinal: Negative.  Negative for abdominal pain, diarrhea, nausea and vomiting.   Genitourinary:  Positive for decreased urine volume, dysuria and frequency.   Neurological: Negative.    All other systems reviewed and are negative.                                 Objective    Vitals:    05/16/25 0819   BP: 117/83   Pulse: 72   Resp: 18   Temp: 36.4 °C (97.6 °F)   TempSrc: Oral   SpO2: 97%   Weight: 79.4 kg (175 lb)   Height: 1.626 m (5' 4\")     Patient's last menstrual period was 05/05/2025.    Physical Exam  Vitals and nursing note reviewed.   Constitutional:       Appearance: She is not ill-appearing or toxic-appearing.   HENT:      Head: Atraumatic.   Cardiovascular:      Rate and Rhythm: Normal rate and regular rhythm.      Pulses: Normal pulses.      Heart sounds: Normal heart sounds.   Pulmonary:      Effort: Pulmonary effort is normal.      Breath sounds: Normal breath sounds.   Abdominal:      General: Abdomen is flat. Bowel sounds are normal.      Palpations: Abdomen is soft.      Tenderness: There is abdominal tenderness in the suprapubic area. There is no right CVA tenderness or left CVA tenderness.   Skin:     General: Skin is warm and dry.      Capillary Refill: Capillary refill takes less than 2 seconds.   Neurological:      Mental Status: She is alert and oriented to person, place, and time.   Psychiatric:         Behavior: Behavior normal.         Procedures    Point of Care Test & Imaging Results from this visit  Results for orders placed or performed in visit on 05/16/25   POCT UA Automated manually resulted   Result Value Ref " Range    POC Color, Urine Yellow Straw, Yellow, Light-Yellow    POC Appearance, Urine Clear Clear    POC Glucose, Urine NEGATIVE NEGATIVE mg/dl    POC Bilirubin, Urine NEGATIVE NEGATIVE    POC Ketones, Urine NEGATIVE NEGATIVE mg/dl    POC Specific Gravity, Urine >=1.030 1.005 - 1.035    POC Blood, Urine NEGATIVE NEGATIVE    POC PH, Urine 6.0 No Reference Range Established PH    POC Protein, Urine NEGATIVE NEGATIVE mg/dl    POC Urobilinogen, Urine 0.2 0.2, 1.0 EU/DL    Poc Nitrite, Urine NEGATIVE NEGATIVE    POC Leukocytes, Urine NEGATIVE NEGATIVE      Imaging  No results found.    Cardiology, Vascular, and Other Imaging  No other imaging results found for the past 2 days      Diagnostic study results (if any) were reviewed by ESAU Fitzgerald.    Assessment/Plan   Allergies, medications, history, and pertinent labs/EKGs/Imaging reviewed by ESAU Fitzgerald.     Medical Decision Making  Risks, benefits, and alternatives of the medications and treatment plan prescribed today were discussed, and patient expressed understanding. Plan follow up as discussed or as needed if any worsening symptoms or change in condition. Reinforced red flags including (but not limited to): severe or worsening pain; difficulty swallowing; stiff neck; shortness of breath; coughing or vomiting blood; chest pain; and new or increased fever are indications to go to the Emergency Department.  At time of discharge patient was clinically well-appearing and HDS for outpatient management. The patient and/or family was educated regarding diagnosis, supportive care, OTC and Rx medications. The patient and/or family was given the opportunity to ask questions prior to discharge.  They verbalized understanding of my discussion of the plans for treatment, expected course, indications to return to  or seek further evaluation in ED, and the need for timely follow up as directed.   They were provided with a work/school excuse if  requested. The after-visit summary was given to the patient and care instructions were reviewed with the patient. All questions were answered and the patient verbalized understanding of the plan of care for today.  Plan:  Recent visit notes reviewed  Meds as above  Increase clear fluids  Pcp follow up this week if not improving or worsening  ER visit anytime 24/7 for acute worsening or changing condition      Orders and Diagnoses  Diagnoses and all orders for this visit:  Possible exposure to STI  -     cefTRIAXone (Rocephin) 500 mg in lidocaine (Xylocaine) 2 mL injection  -     azithromycin (Zithromax) 250 mg tablet; Take 4 tablets (1,000 mg) by mouth 1 time for 1 dose. Take 4 tablets once.  -     Urine Culture  -     Trichomonas vaginalis, Amplified  -     C. trachomatis / N. gonorrhoeae, Amplified, Urogenital  -     POCT pregnancy, urine manually resulted  Frequency of urination  -     POCT UA Automated manually resulted      Medical Admin Record      Patient disposition: Home    Electronically signed by ESAU Fitzgerald  8:54 AM           [1] (Not in a hospital admission)   [2]   Past Medical History:  Diagnosis Date    Abnormal cytological findings in specimens from other organs, systems and tissues     LSIL (low grade squamous intraepithelial lesion) on Pap smear    Anxiety     Anxiety disorder, unspecified     Anxiety and depression    Depression     Hypothyroidism     Personal history of other diseases of the digestive system     History of gastroesophageal reflux (GERD)    Personal history of other diseases of the respiratory system     History of asthma    Personal history of other diseases of the respiratory system     History of bronchitis    Personal history of other diseases of urinary system     History of kidney disease    Personal history of other endocrine, nutritional and metabolic disease     History of high cholesterol    Personal history of other specified conditions     History of  heartburn   [3]   Past Surgical History:  Procedure Laterality Date    COLONOSCOPY      OTHER SURGICAL HISTORY  02/02/2021    No history of surgery    TONSILLECTOMY

## 2025-05-17 LAB
C TRACH RRNA SPEC QL NAA+PROBE: NOT DETECTED
N GONORRHOEA RRNA SPEC QL NAA+PROBE: NOT DETECTED
QUEST GC CT AMPLIFIED (ALWAYS MESSAGE): NORMAL
T VAGINALIS RRNA SPEC QL NAA+PROBE: NOT DETECTED

## 2025-05-18 LAB — BACTERIA UR CULT: NORMAL

## 2025-05-19 PROCEDURE — RXMED WILLOW AMBULATORY MEDICATION CHARGE

## 2025-05-20 ENCOUNTER — PHARMACY VISIT (OUTPATIENT)
Dept: PHARMACY | Facility: CLINIC | Age: 35
End: 2025-05-20
Payer: COMMERCIAL

## 2025-05-30 ENCOUNTER — APPOINTMENT (OUTPATIENT)
Dept: OBSTETRICS AND GYNECOLOGY | Facility: CLINIC | Age: 35
End: 2025-05-30
Payer: COMMERCIAL

## 2025-06-12 PROCEDURE — RXMED WILLOW AMBULATORY MEDICATION CHARGE

## 2025-06-13 ENCOUNTER — PHARMACY VISIT (OUTPATIENT)
Dept: PHARMACY | Facility: CLINIC | Age: 35
End: 2025-06-13
Payer: COMMERCIAL

## 2025-06-16 PROCEDURE — RXMED WILLOW AMBULATORY MEDICATION CHARGE

## 2025-06-17 ENCOUNTER — PHARMACY VISIT (OUTPATIENT)
Dept: PHARMACY | Facility: CLINIC | Age: 35
End: 2025-06-17
Payer: COMMERCIAL

## 2025-07-09 PROCEDURE — RXMED WILLOW AMBULATORY MEDICATION CHARGE

## 2025-07-12 ENCOUNTER — PHARMACY VISIT (OUTPATIENT)
Dept: PHARMACY | Facility: CLINIC | Age: 35
End: 2025-07-12
Payer: COMMERCIAL

## 2025-08-07 ENCOUNTER — HOSPITAL ENCOUNTER (EMERGENCY)
Facility: HOSPITAL | Age: 35
Discharge: HOME | End: 2025-08-07
Payer: COMMERCIAL

## 2025-08-07 VITALS
OXYGEN SATURATION: 99 % | DIASTOLIC BLOOD PRESSURE: 79 MMHG | BODY MASS INDEX: 29.02 KG/M2 | RESPIRATION RATE: 16 BRPM | HEIGHT: 64 IN | SYSTOLIC BLOOD PRESSURE: 111 MMHG | HEART RATE: 70 BPM | TEMPERATURE: 97.3 F | WEIGHT: 170 LBS

## 2025-08-07 DIAGNOSIS — M79.18 PAIN OF MUSCLE OF LOWER LEG: ICD-10-CM

## 2025-08-07 DIAGNOSIS — M54.30 SCIATICA, UNSPECIFIED LATERALITY: Primary | ICD-10-CM

## 2025-08-07 LAB
ANION GAP SERPL CALC-SCNC: 11 MMOL/L (ref 10–20)
BUN SERPL-MCNC: 17 MG/DL (ref 6–23)
CALCIUM SERPL-MCNC: 9.1 MG/DL (ref 8.6–10.3)
CHLORIDE SERPL-SCNC: 102 MMOL/L (ref 98–107)
CK SERPL-CCNC: 161 U/L (ref 0–215)
CO2 SERPL-SCNC: 28 MMOL/L (ref 21–32)
CREAT SERPL-MCNC: 0.81 MG/DL (ref 0.5–1.05)
EGFRCR SERPLBLD CKD-EPI 2021: >90 ML/MIN/1.73M*2
GLUCOSE SERPL-MCNC: 88 MG/DL (ref 74–99)
POTASSIUM SERPL-SCNC: 3.8 MMOL/L (ref 3.5–5.3)
SODIUM SERPL-SCNC: 137 MMOL/L (ref 136–145)

## 2025-08-07 PROCEDURE — 36415 COLL VENOUS BLD VENIPUNCTURE: CPT | Performed by: EMERGENCY MEDICINE

## 2025-08-07 PROCEDURE — 2500000004 HC RX 250 GENERAL PHARMACY W/ HCPCS (ALT 636 FOR OP/ED): Performed by: PHYSICIAN ASSISTANT

## 2025-08-07 PROCEDURE — 96361 HYDRATE IV INFUSION ADD-ON: CPT

## 2025-08-07 PROCEDURE — 82374 ASSAY BLOOD CARBON DIOXIDE: CPT | Performed by: EMERGENCY MEDICINE

## 2025-08-07 PROCEDURE — 82550 ASSAY OF CK (CPK): CPT | Performed by: EMERGENCY MEDICINE

## 2025-08-07 PROCEDURE — 96374 THER/PROPH/DIAG INJ IV PUSH: CPT

## 2025-08-07 PROCEDURE — 99284 EMERGENCY DEPT VISIT MOD MDM: CPT | Mod: 25

## 2025-08-07 PROCEDURE — 99284 EMERGENCY DEPT VISIT MOD MDM: CPT | Performed by: PHYSICIAN ASSISTANT

## 2025-08-07 RX ORDER — KETOROLAC TROMETHAMINE 15 MG/ML
15 INJECTION, SOLUTION INTRAMUSCULAR; INTRAVENOUS ONCE
Status: COMPLETED | OUTPATIENT
Start: 2025-08-07 | End: 2025-08-07

## 2025-08-07 RX ORDER — METHYLPREDNISOLONE 4 MG/1
TABLET ORAL
Qty: 21 TABLET | Refills: 0 | Status: SHIPPED | OUTPATIENT
Start: 2025-08-07

## 2025-08-07 RX ORDER — NAPROXEN 500 MG/1
500 TABLET ORAL
Qty: 14 TABLET | Refills: 0 | Status: SHIPPED | OUTPATIENT
Start: 2025-08-07 | End: 2025-08-14

## 2025-08-07 RX ADMIN — KETOROLAC TROMETHAMINE 15 MG: 15 INJECTION, SOLUTION INTRAMUSCULAR; INTRAVENOUS at 14:05

## 2025-08-07 RX ADMIN — SODIUM CHLORIDE, SODIUM LACTATE, POTASSIUM CHLORIDE, AND CALCIUM CHLORIDE 1000 ML: .6; .31; .03; .02 INJECTION, SOLUTION INTRAVENOUS at 13:27

## 2025-08-07 ASSESSMENT — PAIN SCALES - GENERAL
PAINLEVEL_OUTOF10: 5 - MODERATE PAIN
PAINLEVEL_OUTOF10: 7
PAINLEVEL_OUTOF10: 0 - NO PAIN
PAINLEVEL_OUTOF10: 4

## 2025-08-07 ASSESSMENT — PAIN - FUNCTIONAL ASSESSMENT
PAIN_FUNCTIONAL_ASSESSMENT: 0-10

## 2025-08-07 ASSESSMENT — LIFESTYLE VARIABLES
HAVE PEOPLE ANNOYED YOU BY CRITICIZING YOUR DRINKING: NO
TOTAL SCORE: 0
EVER FELT BAD OR GUILTY ABOUT YOUR DRINKING: NO
EVER HAD A DRINK FIRST THING IN THE MORNING TO STEADY YOUR NERVES TO GET RID OF A HANGOVER: NO
HAVE YOU EVER FELT YOU SHOULD CUT DOWN ON YOUR DRINKING: NO

## 2025-08-07 NOTE — ED PROVIDER NOTES
"Emergency Department Encounter  [facility] EMERGENCY MEDICINE    Patient: Flora Burroughs  MRN: [id number]  : 1990  Date of Evaluation: 2025  ED Provider: Nelida Redd PA-C        History of Present Illness     This is a 33 y/o F presenting to the ED with lower extremity pain and weakness. Patient states she has been training for a marathon since May and recently took a week off last week for a trip to [place]. Upon her return home, she resumed training and ran 13 miles yesterday. After her run yesterday, she states she experienced increasing difficulty walking with a significant sciatica-like, shooting pain down her legs with intermittent  tingling in her toes. She is unable to recall any dark colored urine. She states she did fall to the floor yesterday due to her symptoms but did not hit her head, lose consciousness, or experience any lightheadedness. She denies any nausea, shortness of breath, chest pain, and abdominal pain. She is not on any blood thinners.  Patient does endorse having episodes of what she described as sciatica-like pain in the past, but never this severe.  Denies any episodes of bowel or bladder incontinence or saddle anesthesia.  Denies any back pain at this time.  Denies IV drug use, blood thinner use, or personal history of cancer      History provided by:  Patient   used: No             Visit Vitals  /58   Pulse 65   Temp 36.3 °C (97.3 °F) (Temporal)   Resp 18   Ht 1.626 m (5' 4\")   Wt 77.1 kg (170 lb)   SpO2 98%   BMI 29.18 kg/m²   OB Status Having periods   Smoking Status Never   BSA 1.87 m²          Physical Exam       Triage vitals:  T 36.3 °C (97.3 °F)  HR 65  /58  RR 18  O2 98 % None (Room air)    Physical Exam     Physical exam:   General: Vitals noted, no distress. Afebrile.   EENT:  Hearing grossly intact. Normal phonation. MMM. Airway patient. PERRL. EOMI.   Cardiac: Regular, rate, rhythm. Normal S1 and S2.  No " murmurs, gallops, rubs.   Pulmonary: Good air exchange. Lungs clear bilaterally. No wheezes, rhonchi, rales. No accessory muscle use.   Abdomen: Soft, nonsurgical. Nontender. No peritoneal signs.   Back: No CVA tenderness. No midline tenderness or paraspinal tenderness. No obvious deformity or step off.   Extremities: No peripheral edema.  Full range of motion. Moves all extremities freely. No tenderness throughout extremities.  Compartments of the lower extremities are soft to palpation.  No reproducible tenderness to bilateral lower extremities.  No discoloration to bilateral lower extremities.  DP and PT pulses 2+ and equal bilaterally.  Skin: No rash. Warm and Dry.   Neuro: No focal neurologic deficits. CN 2-12 grossly intact. Sensation equal bilaterally.  5 out of 5 strength bilateral upper and lower extremities.      Results       Labs Reviewed   BASIC METABOLIC PANEL - Normal       Result Value    Glucose 88      Sodium 137      Potassium 3.8      Chloride 102      Bicarbonate 28      Anion Gap 11      Urea Nitrogen 17      Creatinine 0.81      eGFR >90      Calcium 9.1     CREATINE KINASE - Normal    Creatine Kinase 161         No orders to display         Medical Decision Making & ED Course         ED Course & [facility]     Medical Decision Making  This is a 34-year-old female who presents to the ED with bilateral lower extremity pain/paresthesias that began after a long run when training for a marathon.  Vital stable upon arrival to the ED.  On physical examination patient has no obvious deformity to bilateral lower extremities.  Neurovascular intact throughout bilateral lower extremities.  No reproducible tenderness to the large muscle groups of the lower extremities.  No posterior calf tenderness.  No evidence of DVT on exam.  Full strength and sensation to bilateral lower extremities.  No reproducible tenderness to the back.  Specifically no midline L-spine tenderness palpation.  Patient had no red flags for  cauda equina syndrome at this time.  Laboratory studies ordered to evaluate for rhabdomyolysis including BMP and CK.  CK normal at 161.  BMP within normal limits, specifically normal creatinine of 0.81.  Patient ordered IV fluids as well as Toradol for her symptoms.  On reassessment she was feeling improved.  She was informed of all of her results.  I discussed gradually increasing her activity as tolerated as an outpatient.  I did recommend close follow-up with her primary care provider.  She was written a prescription for Medrol Dosepak due to her sciatica-like symptoms.  She was given strict return precautions concerning her symptoms, specifically any episodes of bowel or bladder incontinence or saddle anesthesia or new weakness to her lower extremities.  Patient was agreeable to this plan.  She had no further questions at this time and she was discharged from the ED in stable condition.         Diagnoses as of 08/07/25 1449   Sciatica, unspecified laterality   Pain of muscle of lower leg           Disposition   As a result of the work-up, the patient was discharged home.  she was informed of her diagnosis and instructed to come back with any concerns or worsening of condition.  she and was agreeable to the plan as discussed above.  she was given the opportunity to ask questions.  All of the patient's questions were answered.    Procedures     Patient was seen independently    Procedures    Nelida Redd PA-C  Emergency Medicine      Nelida Redd PA-C  08/07/25 1441

## 2025-08-07 NOTE — Clinical Note
Flora Burroughs was seen and treated in our emergency department on 8/7/2025.  She may return to work on 08/09/2025.       If you have any questions or concerns, please don't hesitate to call.      Nelida Redd PA-C

## 2025-08-08 LAB
HOLD SPECIMEN: NORMAL

## (undated) DEVICE — ADHESIVE, SKIN, MASTISOL, 2/3 CC VIAL

## (undated) DEVICE — SPONGE, LAP, XRAY DECT, 18IN X 18IN, W/MASTER DMT, STERILE

## (undated) DEVICE — DRESSING, ABDOMINAL, WET PRUF, TENDERSORB, 5 X 9 IN, STERILE

## (undated) DEVICE — NEEDLE, HYPO, 22G X 1 1/2IN, ECLIP, LF

## (undated) DEVICE — TAPE, 3IN X 10YD

## (undated) DEVICE — Device

## (undated) DEVICE — GOWN, SURGICAL, SMARTGOWN, XLARGE, STERILE

## (undated) DEVICE — NEEDLE, ELECTRODE, ELECTROSURGICAL, INSULATED

## (undated) DEVICE — CLIPPER, SURGICAL BLADE ASSEMBLY, GENERAL PURPOSE, SINGLE USE

## (undated) DEVICE — PAD, GROUNDING, ELECTROSURGICAL, W/9 FT CABLE, POLYHESIVE II, ADULT, LF

## (undated) DEVICE — PACK, BASIC

## (undated) DEVICE — REST, HEAD, BAGEL, 7 IN

## (undated) DEVICE — BRIEF, CURITY, XLARGE, MESH

## (undated) DEVICE — SYRINGE, 10 CC, LUER LOCK

## (undated) DEVICE — DRESSING, NON-ADHERENT, TELFA, OUCHLESS, 3 X 8 IN, STERILE

## (undated) DEVICE — TIP, SUCTION, YANKAUER, FLEXIBLE

## (undated) DEVICE — TUBING, SUCTION, CONNECTING, STERILE 0.25 X 120 IN., LF